# Patient Record
(demographics unavailable — no encounter records)

---

## 2018-07-17 NOTE — US
EXAMINATION TYPE: US thyroid st tissue head/neck

 

DATE OF EXAM: 7/17/2018

 

COMPARISON: NONE

 

CLINICAL HISTORY: Hyperthyroidism, E05.90. abn labs

 

GLAND SIZE:

 

Right Lobe: 4.6 x 1.7 x 1.3 cm

** Overall Parenchyma:  homogenous

Left Lobe: 4.5 x 1.4 x 1.5 cm

** Overall Parenchyma:  homogeneous

Isthmus Thickness:  0.2 cm

 

NODULES

 

RIGHT:   # of nodules measured on right: 0

 

 

LEFT:    # of nodules measured on left:  0

 

 

ISTHMUS:    # of nodules measured in the isthmus:  0

 

 

Bilateral neck scanned, no evidence of lymphadenopathy.

 

 

 

 

 

IMPRESSION:

Normal thyroid sonogram.